# Patient Record
Sex: MALE | Race: WHITE | NOT HISPANIC OR LATINO | Employment: STUDENT | URBAN - METROPOLITAN AREA
[De-identification: names, ages, dates, MRNs, and addresses within clinical notes are randomized per-mention and may not be internally consistent; named-entity substitution may affect disease eponyms.]

---

## 2023-06-24 ENCOUNTER — OFFICE VISIT (OUTPATIENT)
Dept: URGENT CARE | Facility: CLINIC | Age: 23
End: 2023-06-24
Payer: COMMERCIAL

## 2023-06-24 VITALS
BODY MASS INDEX: 20.32 KG/M2 | SYSTOLIC BLOOD PRESSURE: 103 MMHG | TEMPERATURE: 98.3 F | WEIGHT: 150 LBS | OXYGEN SATURATION: 98 % | DIASTOLIC BLOOD PRESSURE: 56 MMHG | HEART RATE: 72 BPM | RESPIRATION RATE: 14 BRPM | HEIGHT: 72 IN

## 2023-06-24 DIAGNOSIS — B35.4 TINEA CORPORIS: Primary | ICD-10-CM

## 2023-06-24 PROCEDURE — 99283 EMERGENCY DEPT VISIT LOW MDM: CPT | Performed by: PHYSICIAN ASSISTANT

## 2023-06-24 PROCEDURE — G0382 LEV 3 HOSP TYPE B ED VISIT: HCPCS | Performed by: PHYSICIAN ASSISTANT

## 2023-06-24 RX ORDER — CLOTRIMAZOLE AND BETAMETHASONE DIPROPIONATE 10; .64 MG/G; MG/G
CREAM TOPICAL 2 TIMES DAILY
Qty: 45 G | Refills: 0 | Status: SHIPPED | OUTPATIENT
Start: 2023-06-24

## 2023-06-24 NOTE — PATIENT INSTRUCTIONS
Tinea Corporis   WHAT YOU NEED TO KNOW:   Tinea corporis, also called ringworm, is a skin infection caused by a fungus  It usually affects the skin on the face, chest, or limbs  Tinea corporis is most common in children and athletes  DISCHARGE INSTRUCTIONS:   Call your doctor if:   You have a fever  Your rash continues to spread after 7 days of treatment  Your rash is not gone within 2 weeks  The area around your sore becomes red, warm, tender, swollen, or smells bad  You have questions or concerns about your condition or care  Medicines:   Antifungal medicine  may be given as a cream or pill  Use the medicine until it is gone, even if it looks like your infection is gone sooner  Take your medicine as directed  Contact your healthcare provider if you think your medicine is not helping or if you have side effects  Tell your provider if you are allergic to any medicine  Keep a list of the medicines, vitamins, and herbs you take  Include the amounts, and when and why you take them  Bring the list or the pill bottles to follow-up visits  Carry your medicine list with you in case of an emergency  Prevent tinea corporis:   Wash all items that come into contact with infected skin  Wash all towels, clothes, and bedding in hot water  Use laundry soap  Clean shower stalls, mats, and floors with a germ-killing or fungus-killing   Do not share personal items  Examples include towels, brushes, spence, and hair accessories  Keep your skin, hair, and nails clean and dry  Dry your skin before you put medicine on the infected area  Wash your hands often  Do not scratch your sores  This may cause the infection to spread  Do not participate in contact sports, as directed  Talk to your provider before you participate in contact sports, such as wrestling  Your provider may tell you to wait for 72 hours after you start treatment  Have infected pets treated by a     A patch of missing fur is a sign of infection in a pet  Wear gloves and long sleeves if you handle an infected animal  Always wash your hands after handling the animal  Vacuum your home to remove infected fur or skin flakes  Disinfect surfaces and bedding that your animal comes into contact with  Follow up with your doctor as directed:  Write down your questions so you remember to ask them during your visits  © Copyright [employer] 2022 Information is for End User's use only and may not be sold, redistributed or otherwise used for commercial purposes  The above information is an  only  It is not intended as medical advice for individual conditions or treatments  Talk to your doctor, nurse or pharmacist before following any medical regimen to see if it is safe and effective for you

## 2023-06-27 NOTE — PROGRESS NOTES
[facility]        NAME: Polo Leiva is a 25 y o  male  : 2000    MRN: [id number]  DATE: 2023  TIME: 12:00 PM    Assessment and Plan   Tinea corporis [B35 4]  1  Tinea corporis  clotrimazole-betamethasone (LOTRISONE) 1-0 05 % cream            Patient Instructions     Patient has localized tinea rash on the right shoulder  I prescribed him Lotrisone cream and he should apply for at least the next 2 weeks and if no significant improvement if not resolved within that time, then should be further evaluated  Follow up with PCP in 3-5 days  Proceed to  ER if symptoms worsen  Chief Complaint     Chief Complaint   Patient presents with   • Rash     Pt has a rash on his right arm- he has been using lotrimin x 4 weeks and its not going away  History of Present Illness       Patient presents with localized rash of his right shoulder which she believes may be due to ringworm  He has been applying Lotrimin for almost the past month without significant improvement  It was itchy at first but now mainly asymptomatic  Denies any other distribution of the rash  Rash        Review of Systems   Review of Systems   Constitutional: Negative  Respiratory: Negative  Cardiovascular: Negative  Gastrointestinal: Negative  Genitourinary: Negative  Skin: Positive for rash (Right shoulder)  Current Medications       Current Outpatient Medications:   •  clotrimazole-betamethasone (LOTRISONE) 1-0 05 % cream, Apply topically 2 (two) times a day, Disp: 45 g, Rfl: 0    Current Allergies     Allergies as of 2023   • (No Known Allergies)            The following portions of the patient's history were reviewed and updated as appropriate: allergies, current medications, past family history, past medical history, past social history, past surgical history and problem list      Past Medical History:   Diagnosis Date   • No known health problems        History reviewed   No pertinent surgical history  History reviewed  No pertinent family history  Medications have been verified  Objective   /56   Pulse 72   Temp 98 3 °F (36 8 °C)   Resp 14   Ht 6' (1 829 m)   Wt 68 kg (150 lb)   SpO2 98%   BMI 20 34 kg/m²   No LMP for male patient  Physical Exam     Physical Exam  Vitals reviewed  Constitutional:       General: He is not in acute distress  Appearance: He is well-developed  Skin:     Findings: Rash (Anterior right shoulder with localized mildly erythematous circumscribed macular rash with central clearing and scaly in appearance resembling tinea corporis) present  Neurological:      Mental Status: He is alert and oriented to person, place, and time

## 2023-10-30 ENCOUNTER — TRANSCRIBE ORDERS (OUTPATIENT)
Dept: URGENT CARE | Facility: CLINIC | Age: 23
End: 2023-10-30

## 2023-10-30 DIAGNOSIS — M79.675 PAIN OF TOE OF LEFT FOOT: Primary | ICD-10-CM

## 2023-10-31 ENCOUNTER — APPOINTMENT (OUTPATIENT)
Dept: RADIOLOGY | Facility: CLINIC | Age: 23
End: 2023-10-31
Payer: COMMERCIAL

## 2023-10-31 DIAGNOSIS — M79.675 PAIN OF TOE OF LEFT FOOT: ICD-10-CM

## 2023-10-31 PROCEDURE — 73630 X-RAY EXAM OF FOOT: CPT
